# Patient Record
Sex: MALE | Race: BLACK OR AFRICAN AMERICAN | Employment: UNEMPLOYED | URBAN - METROPOLITAN AREA
[De-identification: names, ages, dates, MRNs, and addresses within clinical notes are randomized per-mention and may not be internally consistent; named-entity substitution may affect disease eponyms.]

---
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
## 2025-07-08 ENCOUNTER — OFFICE VISIT (OUTPATIENT)
Dept: FAMILY MEDICINE CLINIC | Age: 20
End: 2025-07-08
Payer: COMMERCIAL

## 2025-07-08 VITALS
TEMPERATURE: 97 F | DIASTOLIC BLOOD PRESSURE: 70 MMHG | BODY MASS INDEX: 18.34 KG/M2 | SYSTOLIC BLOOD PRESSURE: 110 MMHG | OXYGEN SATURATION: 97 % | HEART RATE: 85 BPM | HEIGHT: 71 IN | WEIGHT: 131 LBS

## 2025-07-08 DIAGNOSIS — Z13.29 THYROID DISORDER SCREEN: ICD-10-CM

## 2025-07-08 DIAGNOSIS — E53.8 VITAMIN B12 DEFICIENCY: ICD-10-CM

## 2025-07-08 DIAGNOSIS — Z13.0 SCREENING FOR IRON DEFICIENCY ANEMIA: ICD-10-CM

## 2025-07-08 DIAGNOSIS — E55.9 VITAMIN D DEFICIENCY: ICD-10-CM

## 2025-07-08 DIAGNOSIS — Z76.89 ESTABLISHING CARE WITH NEW DOCTOR, ENCOUNTER FOR: ICD-10-CM

## 2025-07-08 DIAGNOSIS — Z86.39 HISTORY OF HYPERGLYCEMIA: ICD-10-CM

## 2025-07-08 DIAGNOSIS — Z13.6 SCREENING FOR CARDIOVASCULAR CONDITION: ICD-10-CM

## 2025-07-08 DIAGNOSIS — Z13.220 SCREENING FOR HYPERLIPIDEMIA: ICD-10-CM

## 2025-07-08 DIAGNOSIS — F12.90 CANNABIS USE DISORDER: ICD-10-CM

## 2025-07-08 DIAGNOSIS — Z20.2 POSSIBLE EXPOSURE TO STD: Primary | ICD-10-CM

## 2025-07-08 PROCEDURE — 99203 OFFICE O/P NEW LOW 30 MIN: CPT

## 2025-07-08 SDOH — ECONOMIC STABILITY: FOOD INSECURITY: WITHIN THE PAST 12 MONTHS, THE FOOD YOU BOUGHT JUST DIDN'T LAST AND YOU DIDN'T HAVE MONEY TO GET MORE.: NEVER TRUE

## 2025-07-08 SDOH — ECONOMIC STABILITY: FOOD INSECURITY: WITHIN THE PAST 12 MONTHS, YOU WORRIED THAT YOUR FOOD WOULD RUN OUT BEFORE YOU GOT MONEY TO BUY MORE.: NEVER TRUE

## 2025-07-08 ASSESSMENT — PATIENT HEALTH QUESTIONNAIRE - PHQ9
2. FEELING DOWN, DEPRESSED OR HOPELESS: NOT AT ALL
1. LITTLE INTEREST OR PLEASURE IN DOING THINGS: NOT AT ALL
SUM OF ALL RESPONSES TO PHQ QUESTIONS 1-9: 0

## 2025-07-08 NOTE — PROGRESS NOTES
[facility]  [address] [facility]  [id number]  7/14/2025    Yohan Coleman Jr. is a 20 y.o. male who presents today for his medical conditions and/or complaints as noted below.    Yohan Coleman Jr. is scheduled today for New Patient and Check-Up  .      HPI:     History of Present Illness  The patient presents to [facility].    They seek a primary care physician for routine check-ups due to the impending birth of their child. They report no specific health concerns. They are sexually active with one partner and report no penile discharge or dysuria. They occasionally use tobacco and vape, frequently smoke marijuana, and are attempting to quit. They procure marijuana from a dispensary. Their alcohol consumption is minimal, limited to a glass of wine on rare occasions. They do not engage in binge drinking or high-risk activities. They adhere to safety measures such as wearing a seatbelt while driving. Their work involves exposure to silica, for which they use a respirator. They change their clothes at home after work. They have a fear of injections due to a past traumatic experience but plan to receive their Tdap vaccine before the birth of their child in 12/2025. They have smoke detectors and a fire extinguisher at home. They have been experiencing weight loss, which they attribute to decreased food intake due to marijuana use.    SOCIAL HISTORY  Occasional tobacco and vape use. Frequent marijuana use, attempting to quit. Rare alcohol consumption, typically a glass of wine. Works at [employer].    Patient has no further concerns for today's visit.  Previous notes reviewed in the chart.      Vitals:    07/08/25 1458   BP: 110/70   Pulse: 85   Temp: 97 °F (36.1 °C)   SpO2: 97%   Weight: 59.4 kg (131 lb)   Height: 1.803 m (5' 11\")      No past medical history on file.   No past surgical history on file.  No family history on file.  Social History     Tobacco Use    Smoking status: Never    Smokeless